# Patient Record
Sex: FEMALE | Race: WHITE | NOT HISPANIC OR LATINO | Employment: FULL TIME | URBAN - NONMETROPOLITAN AREA
[De-identification: names, ages, dates, MRNs, and addresses within clinical notes are randomized per-mention and may not be internally consistent; named-entity substitution may affect disease eponyms.]

---

## 2023-07-20 ENCOUNTER — HOSPITAL ENCOUNTER (EMERGENCY)
Facility: HOSPITAL | Age: 48
Discharge: HOME | End: 2023-07-20
Attending: EMERGENCY MEDICINE | Admitting: EMERGENCY MEDICINE
Payer: COMMERCIAL

## 2023-07-20 ENCOUNTER — APPOINTMENT (EMERGENCY)
Dept: CT IMAGING | Facility: HOSPITAL | Age: 48
End: 2023-07-20
Payer: COMMERCIAL

## 2023-07-20 VITALS
RESPIRATION RATE: 18 BRPM | OXYGEN SATURATION: 98 % | HEART RATE: 87 BPM | SYSTOLIC BLOOD PRESSURE: 129 MMHG | TEMPERATURE: 97.9 F | DIASTOLIC BLOOD PRESSURE: 67 MMHG

## 2023-07-20 DIAGNOSIS — R10.31 RIGHT LOWER QUADRANT ABDOMINAL PAIN: Primary | ICD-10-CM

## 2023-07-20 DIAGNOSIS — N39.0 UTI (URINARY TRACT INFECTION): ICD-10-CM

## 2023-07-20 LAB
ALBUMIN SERPL BCP-MCNC: 4.4 G/DL (ref 3.5–5)
ALP SERPL-CCNC: 95 U/L (ref 34–104)
ALT SERPL W P-5'-P-CCNC: 26 U/L (ref 7–52)
ANION GAP SERPL CALCULATED.3IONS-SCNC: 9 MMOL/L
AST SERPL W P-5'-P-CCNC: 16 U/L (ref 13–39)
BACTERIA UR QL AUTO: ABNORMAL /HPF
BASOPHILS # BLD MANUAL: 0 THOUSAND/UL (ref 0–0.1)
BASOPHILS NFR MAR MANUAL: 0 % (ref 0–1)
BILIRUB SERPL-MCNC: 0.21 MG/DL (ref 0.2–1)
BILIRUB UR QL STRIP: NEGATIVE
BUN SERPL-MCNC: 13 MG/DL (ref 5–25)
CALCIUM SERPL-MCNC: 9.3 MG/DL (ref 8.4–10.2)
CHLORIDE SERPL-SCNC: 101 MMOL/L (ref 96–108)
CLARITY UR: ABNORMAL
CO2 SERPL-SCNC: 28 MMOL/L (ref 21–32)
COLOR UR: YELLOW
CREAT SERPL-MCNC: 0.67 MG/DL (ref 0.6–1.3)
EOSINOPHIL # BLD MANUAL: 0.53 THOUSAND/UL (ref 0–0.4)
EOSINOPHIL NFR BLD MANUAL: 4 % (ref 0–6)
ERYTHROCYTE [DISTWIDTH] IN BLOOD BY AUTOMATED COUNT: 14.4 % (ref 11.6–15.1)
EXT PREGNANCY TEST URINE: NEGATIVE
EXT. CONTROL: NORMAL
GFR SERPL CREATININE-BSD FRML MDRD: 103 ML/MIN/1.73SQ M
GLUCOSE SERPL-MCNC: 115 MG/DL (ref 65–140)
GLUCOSE UR STRIP-MCNC: NEGATIVE MG/DL
HCT VFR BLD AUTO: 40.2 % (ref 34.8–46.1)
HGB BLD-MCNC: 12.7 G/DL (ref 11.5–15.4)
HGB UR QL STRIP.AUTO: NEGATIVE
KETONES UR STRIP-MCNC: NEGATIVE MG/DL
LEUKOCYTE ESTERASE UR QL STRIP: ABNORMAL
LIPASE SERPL-CCNC: 35 U/L (ref 11–82)
LYMPHOCYTES # BLD AUTO: 26 % (ref 14–44)
LYMPHOCYTES # BLD AUTO: 3.42 THOUSAND/UL (ref 0.6–4.47)
MCH RBC QN AUTO: 27.9 PG (ref 26.8–34.3)
MCHC RBC AUTO-ENTMCNC: 31.6 G/DL (ref 31.4–37.4)
MCV RBC AUTO: 88 FL (ref 82–98)
MONOCYTES # BLD AUTO: 1.05 THOUSAND/UL (ref 0–1.22)
MONOCYTES NFR BLD: 8 % (ref 4–12)
NEUTROPHILS # BLD MANUAL: 8.15 THOUSAND/UL (ref 1.85–7.62)
NEUTS SEG NFR BLD AUTO: 62 % (ref 43–75)
NITRITE UR QL STRIP: NEGATIVE
NON-SQ EPI CELLS URNS QL MICRO: ABNORMAL /HPF
PH UR STRIP.AUTO: 6 [PH]
PLATELET # BLD AUTO: 401 THOUSANDS/UL (ref 149–390)
PLATELET BLD QL SMEAR: ABNORMAL
PMV BLD AUTO: 9.4 FL (ref 8.9–12.7)
POTASSIUM SERPL-SCNC: 3.3 MMOL/L (ref 3.5–5.3)
PROT SERPL-MCNC: 7.9 G/DL (ref 6.4–8.4)
PROT UR STRIP-MCNC: NEGATIVE MG/DL
RBC # BLD AUTO: 4.55 MILLION/UL (ref 3.81–5.12)
RBC #/AREA URNS AUTO: ABNORMAL /HPF
RBC MORPH BLD: NORMAL
SODIUM SERPL-SCNC: 138 MMOL/L (ref 135–147)
SP GR UR STRIP.AUTO: 1.02 (ref 1–1.03)
UROBILINOGEN UR QL STRIP.AUTO: 0.2 E.U./DL
WBC # BLD AUTO: 13.14 THOUSAND/UL (ref 4.31–10.16)
WBC #/AREA URNS AUTO: ABNORMAL /HPF

## 2023-07-20 PROCEDURE — 80053 COMPREHEN METABOLIC PANEL: CPT | Performed by: EMERGENCY MEDICINE

## 2023-07-20 PROCEDURE — 81001 URINALYSIS AUTO W/SCOPE: CPT | Performed by: EMERGENCY MEDICINE

## 2023-07-20 PROCEDURE — 83690 ASSAY OF LIPASE: CPT | Performed by: EMERGENCY MEDICINE

## 2023-07-20 PROCEDURE — 85007 BL SMEAR W/DIFF WBC COUNT: CPT | Performed by: EMERGENCY MEDICINE

## 2023-07-20 PROCEDURE — 85027 COMPLETE CBC AUTOMATED: CPT | Performed by: EMERGENCY MEDICINE

## 2023-07-20 PROCEDURE — 81025 URINE PREGNANCY TEST: CPT | Performed by: EMERGENCY MEDICINE

## 2023-07-20 PROCEDURE — 74177 CT ABD & PELVIS W/CONTRAST: CPT

## 2023-07-20 PROCEDURE — 36415 COLL VENOUS BLD VENIPUNCTURE: CPT | Performed by: EMERGENCY MEDICINE

## 2023-07-20 PROCEDURE — G1004 CDSM NDSC: HCPCS

## 2023-07-20 RX ORDER — NITROFURANTOIN 25; 75 MG/1; MG/1
100 CAPSULE ORAL 2 TIMES DAILY
Qty: 10 CAPSULE | Refills: 0 | Status: SHIPPED | OUTPATIENT
Start: 2023-07-20 | End: 2023-07-25

## 2023-07-20 RX ADMIN — IOHEXOL 100 ML: 350 INJECTION, SOLUTION INTRAVENOUS at 18:31

## 2023-07-20 NOTE — ED NOTES
Pt ambulated to the bathroom without assistance. Pt tolerated well. Pt back in bed with call bell in reach and denies any complaints at this time.      Rosi Baca RN  07/20/23 5298

## 2023-07-20 NOTE — DISCHARGE INSTRUCTIONS
Return immediately if worse or any new symptoms  Tylenol 1000 mg every 6 hours as needed  and/or  Advil 400 mg every 6 hours as needed  May take both together  Follow-up with your physician for pelvic ultrasound as per CT results for evaluation of pelvic structure

## 2023-07-20 NOTE — ED PROVIDER NOTES
History  Chief Complaint   Patient presents with   • Abdominal Pain     Pt had a punch biopsy done on Monday. States since the procedure she started with RLQ pain and nausea. Had a follow up appt today and provider wanted her to come to ED to rule out appendicitis      47-year-old female presents emergency room for evaluation of abdominal pain ongoing since Monday. Patient reports that she had a "punch biopsy" performed on Monday for evaluation of possible psoriatic arthritis. Patient reports that she had some discomfort around that area on Monday but that this has become progressively worse and localized to her right lower quadrant. Patient has felt somewhat tired. Slightly "achy."  No diarrhea or vomiting. Mildly nauseated at times. No fever. No urinary complaints. Patient was recently treated for urinary tract infection. She has completed her course of antibiotics. Patient reports that she has had episodes where she has had urinary tract infections without symptomatology. History provided by:  Patient  Abdominal Pain  Pain location:  RLQ  Pain quality: aching, sharp and shooting    Pain radiates to:  Does not radiate  Pain severity:  Moderate  Onset quality:  Gradual  Timing:  Constant  Progression:  Worsening  Worsened by: Movement and palpation  Associated symptoms: nausea    Associated symptoms: no anorexia, no belching, no chills, no cough, no diarrhea, no dysuria, no fever, no hematemesis, no hematochezia, no hematuria, no shortness of breath, no vaginal bleeding, no vaginal discharge and no vomiting        Prior to Admission Medications   Prescriptions Last Dose Informant Patient Reported? Taking?    SUMAtriptan-naproxen (TREXIMET)  MG per tablet   Yes No   Sig: Take 1 tablet by mouth every 2 (two) hours as needed for migraine   amLODIPine (NORVASC) 5 mg tablet   Yes No   cholecalciferol (VITAMIN D3) 1,000 units tablet   Yes No   Sig: Take 1,000 Units by mouth daily   famotidine (PEPCID) 40 MG tablet   Yes No   hydrochlorothiazide (HYDRODIURIL) 12.5 mg tablet   Yes No   Sig: Take 12.5 mg by mouth daily   loratadine-pseudoephedrine (CLARITIN-D 24-HOUR)  mg per 24 hr tablet   Yes No   Sig: Take 1 tablet by mouth daily   meclizine (ANTIVERT) 25 mg tablet   No No   Sig: Take 1 tablet (25 mg total) by mouth every 8 (eight) hours as needed for dizziness   omeprazole (PriLOSEC) 20 mg delayed release capsule   Yes No   Sig: Take 20 mg by mouth daily   ondansetron (ZOFRAN) 4 mg tablet   No No   Sig: Take 1 tablet (4 mg total) by mouth every 8 (eight) hours as needed for nausea or vomiting   pantoprazole (PROTONIX) 40 mg tablet   Yes No   Sig: Take 40 mg by mouth 2 (two) times a day      Facility-Administered Medications: None       Past Medical History:   Diagnosis Date   • Allergic    • Chronic sinusitis    • GERD (gastroesophageal reflux disease)    • Migraines    • Ovarian cyst        Past Surgical History:   Procedure Laterality Date   • CHOLECYSTECTOMY     • FUNCTIONAL ENDOSCOPIC SINUS SURGERY         History reviewed. No pertinent family history. I have reviewed and agree with the history as documented. E-Cigarette/Vaping     E-Cigarette/Vaping Substances     Social History     Tobacco Use   • Smoking status: Former   • Smokeless tobacco: Never   Substance Use Topics   • Alcohol use: No   • Drug use: No       Review of Systems   Constitutional: Negative. Negative for chills and fever. Respiratory: Negative. Negative for cough and shortness of breath. Cardiovascular: Negative. Gastrointestinal: Positive for abdominal pain and nausea. Negative for anorexia, diarrhea, hematemesis, hematochezia and vomiting. Genitourinary: Negative. Negative for dysuria, hematuria, vaginal bleeding and vaginal discharge. All other systems reviewed and are negative. Physical Exam  Physical Exam  Vitals and nursing note reviewed. Constitutional:       General: She is awake. Appearance: Normal appearance. She is well-developed. She is obese. She is not ill-appearing or toxic-appearing. HENT:      Head: Normocephalic and atraumatic. Right Ear: External ear normal.      Left Ear: External ear normal.      Nose: Nose normal.      Mouth/Throat:      Mouth: Mucous membranes are moist.   Eyes:      General: Lids are normal. No scleral icterus. Extraocular Movements: Extraocular movements intact. Pupils: Pupils are equal, round, and reactive to light. Cardiovascular:      Rate and Rhythm: Normal rate and regular rhythm. Heart sounds: Normal heart sounds. No murmur heard. Pulmonary:      Effort: Pulmonary effort is normal. No respiratory distress. Breath sounds: Normal breath sounds. No wheezing, rhonchi or rales. Abdominal:      General: Abdomen is flat. There is no distension. Palpations: Abdomen is soft. Tenderness: There is abdominal tenderness in the right lower quadrant. There is no right CVA tenderness, guarding or rebound. Musculoskeletal:         General: No swelling, tenderness or deformity. Normal range of motion. Cervical back: Normal range of motion and neck supple. Skin:     General: Skin is warm and dry. Coloration: Skin is not jaundiced or pale. Findings: No rash. Neurological:      Mental Status: She is alert and oriented to person, place, and time. Mental status is at baseline. Cranial Nerves: No cranial nerve deficit. Motor: No weakness.    Psychiatric:         Attention and Perception: Attention normal.         Mood and Affect: Mood normal.         Speech: Speech normal.         Behavior: Behavior normal.         Vital Signs  ED Triage Vitals [07/20/23 1721]   Temperature Pulse Respirations Blood Pressure SpO2   97.9 °F (36.6 °C) 97 20 (!) 175/102 98 %      Temp Source Heart Rate Source Patient Position - Orthostatic VS BP Location FiO2 (%)   Temporal Monitor Sitting Left arm --      Pain Score 6           Vitals:    07/20/23 1721   BP: (!) 175/102   Pulse: 97   Patient Position - Orthostatic VS: Sitting         Visual Acuity      ED Medications  Medications   iohexol (OMNIPAQUE) 350 MG/ML injection (SINGLE-DOSE) 100 mL (100 mL Intravenous Given 7/20/23 1831)       Diagnostic Studies  Results Reviewed     Procedure Component Value Units Date/Time    RBC Morphology Reflex Test [[id number]] Collected: 07/20/23 1803    Lab Status: Final result Specimen: Blood from Arm, Right Updated: 07/20/23 1901    CBC and differential [[id number]]  (Abnormal) Collected: 07/20/23 1803    Lab Status: Final result Specimen: Blood from Arm, Right Updated: 07/20/23 1832     WBC 13.14 Thousand/uL      RBC 4.55 Million/uL      Hemoglobin 12.7 g/dL      Hematocrit 40.2 %      MCV 88 fL      MCH 27.9 pg      MCHC 31.6 g/dL      RDW 14.4 %      MPV 9.4 fL      Platelets 187 Thousands/uL     Narrative: This is an appended report. These results have been appended to a previously verified report.     Manual Differential(PHLEBS Do Not Order) [[id number]]  (Abnormal) Collected: 07/20/23 1803    Lab Status: Final result Specimen: Blood from Arm, Right Updated: 07/20/23 1832     Segmented % 62 %      Lymphocytes % 26 %      Monocytes % 8 %      Eosinophils, % 4 %      Basophils % 0 %      Absolute Neutrophils 8.15 Thousand/uL      Lymphocytes Absolute 3.42 Thousand/uL      Monocytes Absolute 1.05 Thousand/uL      Eosinophils Absolute 0.53 Thousand/uL      Basophils Absolute 0.00 Thousand/uL      Total Counted --     RBC Morphology Normal     Platelet Estimate Increased    Comprehensive metabolic panel [[id number]]  (Abnormal) Collected: 07/20/23 1803    Lab Status: Final result Specimen: Blood from Arm, Right Updated: 07/20/23 1824     Sodium 138 mmol/L      Potassium 3.3 mmol/L      Chloride 101 mmol/L      CO2 28 mmol/L      ANION GAP 9 mmol/L      BUN 13 mg/dL      Creatinine 0.67 mg/dL      Glucose 115 mg/dL      Calcium 9.3 mg/dL AST 16 U/L      ALT 26 U/L      Alkaline Phosphatase 95 U/L      Total Protein 7.9 g/dL      Albumin 4.4 g/dL      Total Bilirubin 0.21 mg/dL      eGFR 103 ml/min/1.73sq m     Narrative:      [facility] guidelines for [facility] Kid[facility] Disease (CKD):   •  Stage 1 with normal or high GFR (GFR > 90 mL/min/1.73 square meters)  •  Stage 2 Mild CKD (GFR = 60-89 mL/min/1.73 square meters)  •  Stage 3A Moderate CKD (GFR = 45-59 mL/min/1.73 square meters)  •  Stage 3B Moderate CKD (GFR = 30-44 mL/min/1.73 square meters)  •  Stage 4 Severe CKD (GFR = 15-29 mL/min/1.73 square meters)  •  Stage 5 End Stage CKD (GFR <15 mL/min/1.73 square meters)  Note: GFR calculation is accurate only with a steady state creatinine    Lipase [[id number]]  (Normal) Collected: 07/20/23 1803    Lab Status: Final result Specimen: Blood from Arm, Right Updated: 07/20/23 1824     Lipase 35 u/L     Urine Microscopic [[id number]]  (Abnormal) Collected: 07/20/23 1803    Lab Status: Final result Specimen: Urine, Clean Catch Updated: 07/20/23 1821     RBC, UA None Seen /hpf      WBC, UA 4-10 /hpf      Epithelial Cells Moderate /hpf      Bacteria, UA Occasional /hpf     UA w Reflex to Microscopic w Reflex to Culture [[id number]]  (Abnormal) Collected: 07/20/23 1803    Lab Status: Final result Specimen: Urine, Clean Catch Updated: 07/20/23 1811     Color, UA Yellow     Clarity, UA Slightly Cloudy     Specific Gravity, UA 1.025     pH, UA 6.0     Leukocytes, UA Trace     Nitrite, UA Negative     Protein, UA Negative mg/dl      Glucose, UA Negative mg/dl      Ketones, UA Negative mg/dl      Urobilinogen, UA 0.2 E.U./dl      Bilirubin, UA Negative     Occult Blood, UA Negative    POCT pregnancy, urine [[id number]]  (Normal) Resulted: 07/20/23 1805    Lab Status: Final result Updated: 07/20/23 1805     EXT Preg Test, Ur Negative     Control Valid                 CT abdomen pelvis with contrast    (Results Pending) Procedures  Procedures         ED Course  ED Course as of 07/20/23 1903   Thu Jul 20, 2023   1840 Urinalysis appears contaminated. 1901 Patient signed out to night shift. SBIRT 22yo+    Flowsheet Row Most Recent Value   Initial Alcohol Screen: US AUDIT-C     1. How often do you have a drink containing alcohol? 0 Filed at: 07/20/2023 1855   2. How many drinks containing alcohol do you have on a typical day you are drinking? 0 Filed at: 07/20/2023 1855   3b. FEMALE Any Age, or MALE 65+: How often do you have 4 or more drinks on one occassion? 0 Filed at: 07/20/2023 1855   Audit-C Score 0 Filed at: 07/20/2023 6726   ADIEL: How many times in the past year have you. .. Used an illegal drug or used a prescription medication for non-medical reasons? Never Filed at: 07/20/2023 [address] [place]  Patient presented to the emergency department and a MSE was performed. The patient was evaluated for complaint related to acute abdominal pain in a female patient. Patient is potentially at risk for, but not limited to, acute gastritis, pancreatitis, biliary colic, cholecystitis, diverticulitis, diverticulosis, urinary infection, kidney stone, appendicitis, ulcerative colitis, Crohn's disease, enteritis, viral gastroenteritis, constipation, ovarian cyst, ovarian torsion, genitourinary infection or other disease process unrelated to the abdomen which may cause this symptomatology is also considered. Several of these diagnoses have been evaluated and ruled out by history and physical.  As needed, patient will be further evaluated with laboratory and imaging studies. Higher level diagnostics, such as CT imaging or ultrasound, may also be required. Please see work-up portion of the note for further evaluation of patient's risk. Socioeconomic factors were also considered as part of the decision-making process.   Unless otherwise stated in the chart or patient is admitted as elsewhere documented, any previously prescribed medications will be maintained. Right lower quadrant abdominal pain: acute illness or injury  Amount and/or Complexity of Data Reviewed  Labs: ordered. Radiology: ordered. Risk  Prescription drug management. Disposition  Final diagnoses:   Right lower quadrant abdominal pain   UTI (urinary tract infection)     Time reflects when diagnosis was documented in both MDM as applicable and the Disposition within this note     Time User Action Codes Description Comment    7/20/2023  7:02 PM Williamenciano Momark Add [R10.31] Right lower quadrant abdominal pain     7/20/2023  7:03 PM Lorena Samaniego Add [N39.0] UTI (urinary tract infection)       ED Disposition     None      Follow-up Information     Follow up With Specialties Details Why Contact Info    Adriana Hayes DO Family Medicine  As needed [address] [place] [address]  [phone]            Patient's Medications   Discharge Prescriptions    NITROFURANTOIN (MACROBID) 100 MG CAPSULE    Take 1 capsule (100 mg total) by mouth 2 (two) times a day for 5 days       Start Date: 7/20/2023 End Date: 7/25/2023       Order Dose: 100 mg       Quantity: 10 capsule    Refills: 0       No discharge procedures on file.     PDMP Review     None          ED Provider  Electronically Signed by           Olamide Wilson DO  07/20/23 9938

## 2023-07-21 NOTE — ED CARE HANDOFF
Emergency Department Sign Out Note        Sign out and transfer of care from [facility], CT abdomen pelvis pending                            ED Course as of 07/20/23 2040   Thu Jul 20, 2023 2038 Notes generally feeling well, improved comfort, mild lower abdominal pain, CT results discussed and provided copy, aware of follow-up ultrasound. Notes her dermatologist prescribed Levaquin for her wound in which she will begin taking. Abdomen with mild right lower quadrant tenderness, no rebound or guarding, generally benign. We discussed possible complications going further, appendicitis and voiced good understanding to return immediately if worsening symptoms     Procedures  MDM        Disposition  Final diagnoses:   Right lower quadrant abdominal pain   UTI (urinary tract infection)     Time reflects when diagnosis was documented in both MDM as applicable and the Disposition within this note     Time User Action Codes Description Comment    7/20/2023  7:02 PM Thomas Jovel Add [R10.31] Right lower quadrant abdominal pain     7/20/2023  7:03 PM Nany Samaniego Add [N39.0] UTI (urinary tract infection)       ED Disposition     None      Follow-up Information     Follow up With Specialties Details Why Contact Info    Alcides Wright DO Family Medicine  As needed [address] [address]  [phone]          Patient's Medications   Discharge Prescriptions    NITROFURANTOIN (MACROBID) 100 MG CAPSULE    Take 1 capsule (100 mg total) by mouth 2 (two) times a day for 5 days       Start Date: 7/20/2023 End Date: 7/25/2023       Order Dose: 100 mg       Quantity: 10 capsule    Refills: 0     No discharge procedures on file.        ED Provider  Electronically Signed by     Willy Silva DO  07/20/23 2040